# Patient Record
Sex: FEMALE | Race: WHITE | Employment: UNEMPLOYED | URBAN - METROPOLITAN AREA
[De-identification: names, ages, dates, MRNs, and addresses within clinical notes are randomized per-mention and may not be internally consistent; named-entity substitution may affect disease eponyms.]

---

## 2017-12-04 ENCOUNTER — SNF VISIT (OUTPATIENT)
Dept: INTERNAL MEDICINE CLINIC | Age: 62
End: 2017-12-04

## 2017-12-04 ENCOUNTER — NURSE ONLY (OUTPATIENT)
Dept: LAB | Age: 62
End: 2017-12-04
Attending: NURSE PRACTITIONER

## 2017-12-04 VITALS
TEMPERATURE: 98 F | OXYGEN SATURATION: 98 % | HEART RATE: 60 BPM | RESPIRATION RATE: 18 BRPM | DIASTOLIC BLOOD PRESSURE: 63 MMHG | SYSTOLIC BLOOD PRESSURE: 156 MMHG

## 2017-12-04 DIAGNOSIS — E11.22 CKD STAGE 3 DUE TO TYPE 2 DIABETES MELLITUS (HCC): ICD-10-CM

## 2017-12-04 DIAGNOSIS — D68.2 FACTOR V DEFICIENCY (HCC): ICD-10-CM

## 2017-12-04 DIAGNOSIS — N18.30 CKD STAGE 3 DUE TO TYPE 2 DIABETES MELLITUS (HCC): ICD-10-CM

## 2017-12-04 DIAGNOSIS — I10 HTN (HYPERTENSION): Primary | ICD-10-CM

## 2017-12-04 DIAGNOSIS — S82.302P: Primary | ICD-10-CM

## 2017-12-04 PROBLEM — R91.8 LUNG MASS: Status: ACTIVE | Noted: 2017-12-04

## 2017-12-04 PROBLEM — E11.9 TYPE 2 DIABETES MELLITUS WITHOUT COMPLICATION (HCC): Status: ACTIVE | Noted: 2017-12-04

## 2017-12-04 PROBLEM — K74.69 OTHER CIRRHOSIS OF LIVER (HCC): Status: ACTIVE | Noted: 2017-12-04

## 2017-12-04 PROCEDURE — 85610 PROTHROMBIN TIME: CPT

## 2017-12-04 PROCEDURE — 99310 SBSQ NF CARE HIGH MDM 45: CPT | Performed by: NURSE PRACTITIONER

## 2017-12-04 PROCEDURE — 83735 ASSAY OF MAGNESIUM: CPT

## 2017-12-04 PROCEDURE — 85027 COMPLETE CBC AUTOMATED: CPT

## 2017-12-04 PROCEDURE — 80053 COMPREHEN METABOLIC PANEL: CPT

## 2017-12-04 PROCEDURE — 82306 VITAMIN D 25 HYDROXY: CPT

## 2017-12-04 RX ORDER — FLUOROMETHOLONE 0.1 %
SUSPENSION, DROPS(FINAL DOSAGE FORM)(ML) OPHTHALMIC (EYE)
COMMUNITY
Start: 2017-09-26

## 2017-12-04 RX ORDER — FLUOXETINE 10 MG/1
TABLET, FILM COATED ORAL
COMMUNITY
Start: 2017-11-07

## 2017-12-04 RX ORDER — OXYCODONE 13.5 MG/1
CAPSULE, EXTENDED RELEASE ORAL
COMMUNITY
Start: 2017-11-07

## 2017-12-04 RX ORDER — INSULIN GLARGINE 100 [IU]/ML
INJECTION, SOLUTION SUBCUTANEOUS
COMMUNITY
Start: 2017-11-08

## 2017-12-04 RX ORDER — LISINOPRIL 2.5 MG/1
TABLET ORAL
COMMUNITY
Start: 2017-09-09

## 2017-12-04 RX ORDER — ALPRAZOLAM 1 MG/1
TABLET ORAL
COMMUNITY
Start: 2017-11-07

## 2017-12-04 RX ORDER — WARFARIN SODIUM 5 MG/1
TABLET ORAL
COMMUNITY
Start: 2017-09-26

## 2017-12-04 RX ORDER — OXYCODONE HYDROCHLORIDE 10 MG/1
TABLET ORAL
COMMUNITY
Start: 2017-11-07

## 2017-12-04 RX ORDER — ENOXAPARIN SODIUM 100 MG/ML
INJECTION SUBCUTANEOUS
COMMUNITY
Start: 2017-09-14

## 2017-12-04 RX ORDER — TIMOLOL MALEATE 5 MG/ML
SOLUTION/ DROPS OPHTHALMIC
COMMUNITY
Start: 2017-11-08

## 2017-12-04 RX ORDER — INSULIN ASPART 100 [IU]/ML
INJECTION, SOLUTION INTRAVENOUS; SUBCUTANEOUS
COMMUNITY
Start: 2017-11-08

## 2017-12-04 RX ORDER — LAMOTRIGINE 25 MG/1
TABLET ORAL
COMMUNITY
Start: 2017-11-08

## 2017-12-04 NOTE — PROGRESS NOTES
Mantachie Abo  : 1955  Age 65year old  female patient is admitted to The [address] [place] [address] for rehabilitation due to fall and left tibial fracture.      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 ([facility] [address]    • Glaucoma    • Lung mass     bilateral    • Pancreatitis    • Seizure disorder [facility])      Past Surgical History:  No date:   No date: CATARACT  No date: HYSTERECTOMY  No date: OTHER SURGICAL HISTORY      Comment: reimplantatio nasal congestion, sinus pain or sore throat; and hearing loss negative  RESPIRATORY: denies shortness of breath, wheezing or cough   CARDIOVASCULAR:denies chest pain, no palpitations , denies syncope, denies cough  GI: denies nausea, vomiting, constipation DIAGNOSTICS REVIEWED AT THIS VISIT:  X ray of left lower extremity: Axial and reformatted coronal and sagittal images of the left knee showed commuted depressed fracture involving the anterior medial tibial plateau.   There is no evidence of fracture labs     Lung masses  Monitor per oncology    Nicotine dependence  Recommend cessation     Leukocytosis  Patient is chronic    Major depressive disorder generalized anxiety disorder with panic attacks  prozac  Xanax 1 mg 4 times a day.     Glaucoma  Timolol

## 2017-12-05 ENCOUNTER — NURSE ONLY (OUTPATIENT)
Dept: LAB | Age: 62
End: 2017-12-05
Attending: NURSE PRACTITIONER

## 2017-12-05 DIAGNOSIS — N18.30 CHRONIC KIDNEY DISEASE, STAGE III (MODERATE) (HCC): Primary | ICD-10-CM

## 2017-12-05 PROCEDURE — 85610 PROTHROMBIN TIME: CPT

## 2017-12-07 ENCOUNTER — SNF VISIT (OUTPATIENT)
Dept: INTERNAL MEDICINE CLINIC | Age: 62
End: 2017-12-07

## 2017-12-07 VITALS
DIASTOLIC BLOOD PRESSURE: 59 MMHG | OXYGEN SATURATION: 95 % | HEART RATE: 68 BPM | TEMPERATURE: 97 F | SYSTOLIC BLOOD PRESSURE: 136 MMHG | RESPIRATION RATE: 18 BRPM

## 2017-12-07 DIAGNOSIS — L03.90 CELLULITIS, UNSPECIFIED CELLULITIS SITE: ICD-10-CM

## 2017-12-07 PROCEDURE — 99309 SBSQ NF CARE MODERATE MDM 30: CPT | Performed by: NURSE PRACTITIONER

## 2017-12-07 NOTE — PROGRESS NOTES
Whitney Allen  : 1955  Age 65year old  female patient is admitted to The [address] [place] [address] at [facility] for rehabilitation due to fall and left tibial fracture.      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 ([facility] [address]    • Glaucoma    • Lung mass     bilateral    • Pancreatitis    • Seizure disorder [facility])      Past Surgical History:  No date:   No date: CATARACT  No date: HYSTERECTOMY  No date: OTHER SURGICAL HISTORY      Comment: reimplantatio denies nasal congestion, sinus pain or sore throat; and hearing loss negative  RESPIRATORY: denies shortness of breath, wheezing or cough   CARDIOVASCULAR:denies chest pain, no palpitations , denies syncope, denies cough  GI: denies nausea, vomiting, const and self medication management. DIAGNOSTICS REVIEWED AT THIS VISIT:  X ray of left lower extremity: Axial and reformatted coronal and sagittal images of the left knee showed commuted depressed fracture involving the anterior medial tibial plateau.   Nestor Bergman site.   Keflex 500 mg po bid x 5 days initiated. Medial tibial plateau fracture status post fall  ORIF per Dr. Makeda Horn in place with ace wrap bandage.    Polar ICE machine     COPD  Albuterol prn     Anemia  Vitamin B injection     Constipation  mi

## 2017-12-08 ENCOUNTER — SNF ADMIT/H&P (OUTPATIENT)
Dept: FAMILY MEDICINE CLINIC | Facility: CLINIC | Age: 62
End: 2017-12-08

## 2017-12-08 ENCOUNTER — LAB ENCOUNTER (OUTPATIENT)
Dept: LAB | Age: 62
End: 2017-12-08
Attending: NURSE PRACTITIONER

## 2017-12-08 VITALS
DIASTOLIC BLOOD PRESSURE: 61 MMHG | OXYGEN SATURATION: 97 % | HEART RATE: 71 BPM | RESPIRATION RATE: 18 BRPM | SYSTOLIC BLOOD PRESSURE: 126 MMHG | TEMPERATURE: 98 F

## 2017-12-08 DIAGNOSIS — D68.2 FACTOR V DEFICIENCY (HCC): ICD-10-CM

## 2017-12-08 DIAGNOSIS — I48.91 ATRIAL FIBRILLATION (HCC): Primary | ICD-10-CM

## 2017-12-08 DIAGNOSIS — G89.29 OTHER CHRONIC PAIN: ICD-10-CM

## 2017-12-08 DIAGNOSIS — N18.30 CKD STAGE 3 DUE TO TYPE 2 DIABETES MELLITUS (HCC): ICD-10-CM

## 2017-12-08 DIAGNOSIS — K74.69 OTHER CIRRHOSIS OF LIVER (HCC): ICD-10-CM

## 2017-12-08 DIAGNOSIS — E11.22 CKD STAGE 3 DUE TO TYPE 2 DIABETES MELLITUS (HCC): ICD-10-CM

## 2017-12-08 DIAGNOSIS — L03.90 CELLULITIS, UNSPECIFIED CELLULITIS SITE: ICD-10-CM

## 2017-12-08 DIAGNOSIS — E11.9 TYPE 2 DIABETES MELLITUS WITHOUT COMPLICATION, UNSPECIFIED LONG TERM INSULIN USE STATUS: ICD-10-CM

## 2017-12-08 DIAGNOSIS — S82.302P: Primary | ICD-10-CM

## 2017-12-08 PROCEDURE — 99306 1ST NF CARE HIGH MDM 50: CPT | Performed by: FAMILY MEDICINE

## 2017-12-08 PROCEDURE — 85610 PROTHROMBIN TIME: CPT

## 2017-12-08 NOTE — H&P
ED [facility], [address] [place] [address]    History and Physical    Iqra Ronquillo Patient Status:  No patient class for patient encounter    1955 MRN [id number]   Location [facility] , [facility] Attending No att.  pr Smoker                                                   Packs/day: 1.00      Years: 0.00      Smokeless tobacco: Never Used                      Alcohol use: No              Allergies/Medications:    Allergies:   Alphagan [Brimonidi*      Asa [Aspirin] Lab Results  Component Value Date   WBC 7.3 12/04/2017   HGB 14.4 12/04/2017   HCT 42.3 12/04/2017   .0 12/04/2017   CREATSERUM 1.22 (H) 12/04/2017   BUN 21 (H) 12/04/2017    12/04/2017   K 3.9 12/04/2017    12/04/2017   CO2 25.0 1

## 2017-12-11 ENCOUNTER — LAB ENCOUNTER (OUTPATIENT)
Dept: LAB | Age: 62
End: 2017-12-11
Attending: FAMILY MEDICINE

## 2017-12-11 ENCOUNTER — SNF DISCHARGE (OUTPATIENT)
Dept: INTERNAL MEDICINE CLINIC | Age: 62
End: 2017-12-11

## 2017-12-11 VITALS
SYSTOLIC BLOOD PRESSURE: 146 MMHG | OXYGEN SATURATION: 97 % | HEART RATE: 76 BPM | RESPIRATION RATE: 16 BRPM | TEMPERATURE: 98 F | DIASTOLIC BLOOD PRESSURE: 78 MMHG

## 2017-12-11 DIAGNOSIS — K74.69 OTHER CIRRHOSIS OF LIVER (HCC): ICD-10-CM

## 2017-12-11 DIAGNOSIS — S82.209A TIBIA FRACTURE: Primary | ICD-10-CM

## 2017-12-11 DIAGNOSIS — E11.9 TYPE 2 DIABETES MELLITUS WITHOUT COMPLICATION, UNSPECIFIED LONG TERM INSULIN USE STATUS: ICD-10-CM

## 2017-12-11 DIAGNOSIS — S82.302P: ICD-10-CM

## 2017-12-11 PROCEDURE — 85610 PROTHROMBIN TIME: CPT

## 2017-12-11 PROCEDURE — 83735 ASSAY OF MAGNESIUM: CPT

## 2017-12-11 PROCEDURE — 85027 COMPLETE CBC AUTOMATED: CPT

## 2017-12-11 PROCEDURE — 80053 COMPREHEN METABOLIC PANEL: CPT

## 2017-12-11 NOTE — PROGRESS NOTES
Anil Sevilla, 57/1955, 65year old, female is being discharged from [address] [place] [address] at [address] [facility]    Date of Admission:12/03/2017    Date of Discharge:12/11/2017                           Admitting Diagnoses: fall with RESPIRATORY:clear bilaterally to auscultation no adventitious breath sounds    CARDIOVASCULAR: S1, S2 normal, RRR; no S3, no S4; , --- + murmur   ABDOMEN:  normal active BS+, soft, nondistended;  no masses;  nontender, no guarding, no rebound tenderness. site.   Keflex 500 mg po bid x 5 days initiated.      Medial tibial plateau fracture status post fall  ORIF per Dr. Morales Hess in place with ace wrap bandage.    Polar ICE machine      COPD  Albuterol prn      Anemia  Vitamin B injection      Constipation

## 2018-01-10 PROBLEM — S82.141D CLOSED FRACTURE OF RIGHT TIBIAL PLATEAU WITH ROUTINE HEALING, SUBSEQUENT ENCOUNTER: Status: ACTIVE | Noted: 2018-01-10
